# Patient Record
Sex: MALE | Race: WHITE | NOT HISPANIC OR LATINO | Employment: FULL TIME | URBAN - METROPOLITAN AREA
[De-identification: names, ages, dates, MRNs, and addresses within clinical notes are randomized per-mention and may not be internally consistent; named-entity substitution may affect disease eponyms.]

---

## 2023-03-22 DIAGNOSIS — E78.2 MIXED HYPERLIPIDEMIA: Primary | ICD-10-CM

## 2023-03-22 RX ORDER — ATORVASTATIN CALCIUM 20 MG/1
TABLET, FILM COATED ORAL
Qty: 90 TABLET | Refills: 0 | Status: SHIPPED | OUTPATIENT
Start: 2023-03-22 | End: 2023-06-20

## 2023-04-04 PROBLEM — E78.6 LOW HDL (UNDER 40): Status: ACTIVE | Noted: 2023-04-04

## 2023-04-04 PROBLEM — R05.8 ALLERGIC COUGH: Status: ACTIVE | Noted: 2023-04-04

## 2023-04-04 PROBLEM — E55.9 VITAMIN D DEFICIENCY: Status: ACTIVE | Noted: 2023-04-04

## 2023-04-04 PROBLEM — R53.83 FATIGUE: Status: ACTIVE | Noted: 2023-04-04

## 2023-04-04 PROBLEM — F41.9 ANXIETY AND DEPRESSION: Status: ACTIVE | Noted: 2023-04-04

## 2023-04-04 PROBLEM — K59.00 CONSTIPATION: Status: ACTIVE | Noted: 2023-04-04

## 2023-04-04 PROBLEM — F32.A ANXIETY AND DEPRESSION: Status: ACTIVE | Noted: 2023-04-04

## 2023-04-04 PROBLEM — K21.9 GERD (GASTROESOPHAGEAL REFLUX DISEASE): Status: ACTIVE | Noted: 2023-04-04

## 2023-04-04 PROBLEM — L30.9 ECZEMATOUS DERMATITIS: Status: ACTIVE | Noted: 2023-04-04

## 2023-04-04 PROBLEM — N40.0 BPH (BENIGN PROSTATIC HYPERPLASIA): Status: ACTIVE | Noted: 2023-04-04

## 2023-04-04 PROBLEM — I26.99 PULMONARY EMBOLISM (MULTI): Status: ACTIVE | Noted: 2023-04-04

## 2023-04-04 PROBLEM — G47.33 OBSTRUCTIVE SLEEP APNEA: Status: ACTIVE | Noted: 2023-04-04

## 2023-04-04 PROBLEM — H61.20 EXCESSIVE CERUMEN IN EAR CANAL: Status: ACTIVE | Noted: 2023-04-04

## 2023-04-04 PROBLEM — R13.10 DYSPHAGIA: Status: ACTIVE | Noted: 2023-04-04

## 2023-04-04 PROBLEM — E78.2 MIXED HYPERLIPIDEMIA: Status: ACTIVE | Noted: 2023-04-04

## 2023-04-04 PROBLEM — R32 URINARY INCONTINENCE: Status: ACTIVE | Noted: 2023-04-04

## 2023-04-04 PROBLEM — U07.1 COVID-19 VIRUS INFECTION: Status: ACTIVE | Noted: 2023-04-04

## 2023-04-04 RX ORDER — HYDROCORTISONE 1 %
CREAM (GRAM) TOPICAL 2 TIMES DAILY PRN
COMMUNITY
End: 2024-03-28 | Stop reason: SDUPTHER

## 2023-04-04 RX ORDER — MICONAZOLE NITRATE 2 %
POWDER (GRAM) TOPICAL
COMMUNITY
End: 2024-03-28 | Stop reason: SDUPTHER

## 2023-04-04 RX ORDER — CHOLECALCIFEROL (VITAMIN D3) 125 MCG
1 CAPSULE ORAL DAILY
COMMUNITY
Start: 2016-03-23 | End: 2023-12-06 | Stop reason: SDUPTHER

## 2023-04-04 RX ORDER — FAMOTIDINE 20 MG/1
1 TABLET, FILM COATED ORAL 2 TIMES DAILY
COMMUNITY
Start: 2021-04-10 | End: 2023-12-06 | Stop reason: SDUPTHER

## 2023-04-04 RX ORDER — METHYLPHENIDATE HYDROCHLORIDE EXTENDED RELEASE 10 MG/1
1 TABLET ORAL DAILY
COMMUNITY
Start: 2014-04-24 | End: 2024-02-04 | Stop reason: WASHOUT

## 2023-04-04 RX ORDER — FLUOCINONIDE 0.5 MG/G
CREAM TOPICAL
COMMUNITY
Start: 2014-03-12 | End: 2024-03-28 | Stop reason: SDUPTHER

## 2023-04-04 RX ORDER — ADHESIVE TAPE 1.5"X360"
TAPE, NON-MEDICATED TOPICAL
COMMUNITY
End: 2024-03-28 | Stop reason: SDUPTHER

## 2023-04-04 RX ORDER — APIXABAN 5 MG/1
1 TABLET, FILM COATED ORAL 2 TIMES DAILY
COMMUNITY
Start: 2021-04-12 | End: 2023-07-20

## 2023-04-04 RX ORDER — ACETAMINOPHEN 325 MG/1
2 TABLET ORAL EVERY 4 HOURS PRN
COMMUNITY
Start: 2019-10-18 | End: 2024-03-28 | Stop reason: SDUPTHER

## 2023-04-04 RX ORDER — ESCITALOPRAM OXALATE 10 MG/1
1 TABLET ORAL NIGHTLY
COMMUNITY
Start: 2014-04-24

## 2023-04-04 RX ORDER — ZINC SULFATE 50(220)MG
1 CAPSULE ORAL DAILY
COMMUNITY
Start: 2021-04-07 | End: 2023-05-19

## 2023-04-04 RX ORDER — TAMSULOSIN HYDROCHLORIDE 0.4 MG/1
1 CAPSULE ORAL NIGHTLY
COMMUNITY
Start: 2021-04-10 | End: 2023-10-19

## 2023-04-04 RX ORDER — BUPROPION HYDROCHLORIDE 75 MG/1
1 TABLET ORAL EVERY MORNING
COMMUNITY
Start: 2014-04-24

## 2023-04-04 RX ORDER — MULTIVITAMIN
1 TABLET ORAL DAILY
COMMUNITY
Start: 2017-06-06 | End: 2024-03-28 | Stop reason: SDUPTHER

## 2023-04-04 RX ORDER — MAGNESIUM HYDROXIDE 2400 MG/10ML
30 SUSPENSION ORAL DAILY PRN
COMMUNITY
End: 2024-03-28 | Stop reason: SDUPTHER

## 2023-04-04 RX ORDER — CALCIUM CARBONATE 200(500)MG
TABLET,CHEWABLE ORAL
COMMUNITY
End: 2024-03-28 | Stop reason: SDUPTHER

## 2023-04-04 RX ORDER — CLOTRIMAZOLE 1 %
CREAM (GRAM) TOPICAL
COMMUNITY
Start: 2021-09-15 | End: 2024-03-28 | Stop reason: SDUPTHER

## 2023-04-04 RX ORDER — DOCUSATE SODIUM 100 MG/1
1 CAPSULE, LIQUID FILLED ORAL 2 TIMES DAILY
COMMUNITY
Start: 2021-03-31 | End: 2023-12-15

## 2023-04-27 ENCOUNTER — TELEPHONE (OUTPATIENT)
Dept: PRIMARY CARE | Facility: CLINIC | Age: 43
End: 2023-04-27
Payer: MEDICARE

## 2023-04-27 RX ORDER — NEOMYCIN SULFATE, POLYMYXIN B SULFATE, AND DEXAMETHASONE 3.5; 10000; 1 MG/G; [USP'U]/G; MG/G
OINTMENT OPHTHALMIC
COMMUNITY
Start: 2023-04-26 | End: 2024-03-28 | Stop reason: WASHOUT

## 2023-04-27 NOTE — TELEPHONE ENCOUNTER
Hannah the RN from [facility] [place] is requesting a refill on Methylphenidate 10mg for pt as he's almost out.  Please send to [facility] care of rodrigo

## 2023-04-28 NOTE — TELEPHONE ENCOUNTER
[facility] called and left message for them that pt's Psychiatrist fills this medication for him,.

## 2023-05-05 ENCOUNTER — APPOINTMENT (OUTPATIENT)
Dept: PRIMARY CARE | Facility: CLINIC | Age: 43
End: 2023-05-05
Payer: MEDICARE

## 2023-05-16 ENCOUNTER — OFFICE VISIT (OUTPATIENT)
Dept: PRIMARY CARE | Facility: CLINIC | Age: 43
End: 2023-05-16
Payer: MEDICARE

## 2023-05-16 VITALS
TEMPERATURE: 97.9 F | WEIGHT: 173 LBS | BODY MASS INDEX: 32.66 KG/M2 | HEIGHT: 61 IN | SYSTOLIC BLOOD PRESSURE: 116 MMHG | DIASTOLIC BLOOD PRESSURE: 82 MMHG

## 2023-05-16 DIAGNOSIS — E78.2 MIXED HYPERLIPIDEMIA: Primary | ICD-10-CM

## 2023-05-16 DIAGNOSIS — E55.9 VITAMIN D DEFICIENCY: ICD-10-CM

## 2023-05-16 DIAGNOSIS — R73.09 ELEVATED GLUCOSE: ICD-10-CM

## 2023-05-16 DIAGNOSIS — R53.83 FATIGUE, UNSPECIFIED TYPE: ICD-10-CM

## 2023-05-16 PROBLEM — J80 ACUTE RESPIRATORY DISTRESS SYNDROME (MULTI): Status: ACTIVE | Noted: 2021-03-10

## 2023-05-16 PROBLEM — D64.9 CHRONIC ANEMIA: Status: ACTIVE | Noted: 2021-02-19

## 2023-05-16 PROBLEM — T78.3XXA ANGIOEDEMA OF TONGUE: Status: ACTIVE | Noted: 2021-02-19

## 2023-05-16 PROBLEM — F79 MENTAL HANDICAP: Status: ACTIVE | Noted: 2021-02-19

## 2023-05-16 PROBLEM — E87.6 HYPOKALEMIA: Status: ACTIVE | Noted: 2021-02-19

## 2023-05-16 PROBLEM — R13.12 DYSPHAGIA, OROPHARYNGEAL PHASE: Status: ACTIVE | Noted: 2021-02-19

## 2023-05-16 PROBLEM — J96.00 ACUTE RESPIRATORY FAILURE (MULTI): Status: ACTIVE | Noted: 2021-02-19

## 2023-05-16 PROBLEM — R06.9 RESPIRATORY ABNORMALITY: Status: ACTIVE | Noted: 2021-02-18

## 2023-05-16 PROBLEM — U07.1 COVID-19: Status: ACTIVE | Noted: 2021-02-21

## 2023-05-16 PROBLEM — I10 HYPERTENSION: Status: ACTIVE | Noted: 2021-02-19

## 2023-05-16 PROBLEM — J95.01: Status: ACTIVE | Noted: 2021-02-19

## 2023-05-16 PROCEDURE — 1036F TOBACCO NON-USER: CPT | Performed by: NURSE PRACTITIONER

## 2023-05-16 PROCEDURE — 99214 OFFICE O/P EST MOD 30 MIN: CPT | Performed by: NURSE PRACTITIONER

## 2023-05-16 PROCEDURE — 3074F SYST BP LT 130 MM HG: CPT | Performed by: NURSE PRACTITIONER

## 2023-05-16 PROCEDURE — 3079F DIAST BP 80-89 MM HG: CPT | Performed by: NURSE PRACTITIONER

## 2023-05-16 RX ORDER — BISACODYL 10 MG/1
10 SUPPOSITORY RECTAL DAILY PRN
COMMUNITY
Start: 2021-03-29 | End: 2024-03-28 | Stop reason: SDUPTHER

## 2023-05-16 RX ORDER — GUAIFENESIN 100 MG/5ML
400 SOLUTION ORAL 3 TIMES DAILY
COMMUNITY
Start: 2021-03-29 | End: 2024-02-01 | Stop reason: WASHOUT

## 2023-05-16 RX ORDER — MIDODRINE HYDROCHLORIDE 5 MG/1
5 TABLET ORAL 3 TIMES DAILY PRN
COMMUNITY
Start: 2021-03-29 | End: 2024-03-28 | Stop reason: WASHOUT

## 2023-05-16 ASSESSMENT — PATIENT HEALTH QUESTIONNAIRE - PHQ9
2. FEELING DOWN, DEPRESSED OR HOPELESS: NOT AT ALL
SUM OF ALL RESPONSES TO PHQ9 QUESTIONS 1 AND 2: 0
1. LITTLE INTEREST OR PLEASURE IN DOING THINGS: NOT AT ALL

## 2023-05-16 NOTE — PROGRESS NOTES
"Subjective   Patient ID: Dante Gillespie is a 42 y.o. male who presents for Annual Exam.    HPI   Here with Janell   Sleeping OK  Work M-F  OPHTH  PSYCH  Podiatry - comes to the house.  Regular walking for exercise.    [facility] - 90 day supply    Review of Systems   All other systems reviewed and are negative.        Objective   /82   Temp 36.6 °C (97.9 °F)   Ht 1.549 m (5' 1\")   Wt 78.5 kg (173 lb)   BMI 32.69 kg/m²     Physical Exam  Constitutional:       Appearance: He is obese.      Comments: Consistent with morphology   HENT:      Head: Atraumatic.      Right Ear: Tympanic membrane, ear canal and external ear normal.      Left Ear: Tympanic membrane and external ear normal.      Mouth/Throat:      Pharynx: Oropharynx is clear.   Eyes:      Conjunctiva/sclera: Conjunctivae normal.      Pupils: Pupils are equal, round, and reactive to light.   Cardiovascular:      Rate and Rhythm: Normal rate and regular rhythm.      Heart sounds: Normal heart sounds.   Pulmonary:      Effort: Pulmonary effort is normal.      Breath sounds: Normal breath sounds.   Abdominal:      Palpations: Abdomen is soft.   Genitourinary:     Comments: Defer  Musculoskeletal:      Cervical back: Neck supple.      Comments: Mildly unsteady gait  Poor flexibility     Neurological:      Mental Status: He is alert.       Assessment/Plan   Problem List Items Addressed This Visit          Endocrine/Metabolic    Vitamin D deficiency    Relevant Orders    Vitamin D, Total       Other    Fatigue    Relevant Orders    TSH with reflex to Free T4 if abnormal    CBC and Auto Differential    Mixed hyperlipidemia - Primary    Relevant Orders    Lipid Panel    Elevated glucose    Relevant Orders    Comprehensive Metabolic Panel    Hemoglobin A1C        Labs when fasting /hydrated and 3 month follow up   "

## 2023-05-16 NOTE — PATIENT INSTRUCTIONS
Good to see you today.  Labs when you are fasting 8 hours/hydrate/No supplements the day of the labs.  Lab hours are 6:30-4 M-F   8-12 Sat  No apt needed.  No changes in medications for now.  Add some stretching every day.  Let me know if you need anything.  3 month follow up

## 2023-05-19 DIAGNOSIS — Z00.00 HEALTHCARE MAINTENANCE: Primary | ICD-10-CM

## 2023-05-19 RX ORDER — ZINC SULFATE 50(220)MG
CAPSULE ORAL
Qty: 30 CAPSULE | Refills: 11 | Status: SHIPPED | OUTPATIENT
Start: 2023-05-19 | End: 2024-03-28 | Stop reason: SDUPTHER

## 2023-06-01 PROBLEM — R73.09 ELEVATED GLUCOSE: Status: ACTIVE | Noted: 2023-06-01

## 2023-06-20 DIAGNOSIS — E78.2 MIXED HYPERLIPIDEMIA: ICD-10-CM

## 2023-06-20 PROBLEM — J12.82 PNEUMONIA DUE TO COVID-19 VIRUS: Status: ACTIVE | Noted: 2021-02-02

## 2023-06-20 PROBLEM — U07.1 PNEUMONIA DUE TO COVID-19 VIRUS: Status: ACTIVE | Noted: 2021-02-02

## 2023-06-20 RX ORDER — ALBUTEROL SULFATE 90 UG/1
4 AEROSOL, METERED RESPIRATORY (INHALATION)
COMMUNITY
Start: 2021-02-18 | End: 2024-02-01 | Stop reason: WASHOUT

## 2023-06-20 RX ORDER — ATORVASTATIN CALCIUM 20 MG/1
TABLET, FILM COATED ORAL
Qty: 90 TABLET | Refills: 1 | Status: SHIPPED | OUTPATIENT
Start: 2023-06-20 | End: 2023-12-15

## 2023-07-20 DIAGNOSIS — Z86.711 HISTORY OF PULMONARY EMBOLISM: Primary | ICD-10-CM

## 2023-07-20 RX ORDER — APIXABAN 5 MG/1
5 TABLET, FILM COATED ORAL 2 TIMES DAILY
Qty: 60 TABLET | Refills: 10 | Status: SHIPPED | OUTPATIENT
Start: 2023-07-20 | End: 2024-02-01 | Stop reason: SDUPTHER

## 2023-10-19 DIAGNOSIS — N40.0 BENIGN PROSTATIC HYPERPLASIA, UNSPECIFIED WHETHER LOWER URINARY TRACT SYMPTOMS PRESENT: Primary | ICD-10-CM

## 2023-10-19 RX ORDER — TAMSULOSIN HYDROCHLORIDE 0.4 MG/1
0.4 CAPSULE ORAL
Qty: 90 CAPSULE | Refills: 0 | Status: SHIPPED | OUTPATIENT
Start: 2023-10-19 | End: 2024-01-23 | Stop reason: SDUPTHER

## 2023-10-19 NOTE — TELEPHONE ENCOUNTER
LOV: 05/16/23    Last refilled flomax 01/24/23 by you in the old system for 90 tabs with 2 refills

## 2023-11-07 ENCOUNTER — TELEPHONE (OUTPATIENT)
Dept: PRIMARY CARE | Facility: CLINIC | Age: 43
End: 2023-11-07
Payer: MEDICARE

## 2023-11-09 ENCOUNTER — TELEPHONE (OUTPATIENT)
Dept: PRIMARY CARE | Facility: CLINIC | Age: 43
End: 2023-11-09
Payer: MEDICARE

## 2023-11-09 NOTE — TELEPHONE ENCOUNTER
----- Message from Aisha Ross MA sent at 10/20/2023 10:23 AM EDT -----  Regarding: FW: RX    ----- Message -----  From: KAREN Dickson  Sent: 10/19/2023   4:52 PM EDT  To: Aisha Ross MA  Subject: RX                                               Please call group home.  Rinku missed his last apt.  I am getting refill requests.  Please be sure he is scheduled before the end of the year.

## 2023-11-28 DIAGNOSIS — E03.9 ADULT HYPOTHYROIDISM: Primary | ICD-10-CM

## 2023-11-28 RX ORDER — LEVOTHYROXINE SODIUM 75 UG/1
75 TABLET ORAL DAILY
Qty: 30 TABLET | Refills: 1 | Status: SHIPPED | OUTPATIENT
Start: 2023-11-28 | End: 2024-02-06 | Stop reason: WASHOUT

## 2023-12-01 DIAGNOSIS — E55.9 VITAMIN D DEFICIENCY: ICD-10-CM

## 2023-12-01 DIAGNOSIS — K21.9 GASTROESOPHAGEAL REFLUX DISEASE, UNSPECIFIED WHETHER ESOPHAGITIS PRESENT: Primary | ICD-10-CM

## 2023-12-06 NOTE — TELEPHONE ENCOUNTER
Shiela from [facility] is calling in again, looking for refills for the pt.     Famotidine 20mg   Vitamin D 5000 UT

## 2023-12-07 RX ORDER — FAMOTIDINE 20 MG/1
20 TABLET, FILM COATED ORAL 2 TIMES DAILY
Qty: 60 TABLET | Refills: 0 | Status: SHIPPED | OUTPATIENT
Start: 2023-12-07 | End: 2023-12-15

## 2023-12-07 RX ORDER — CHOLECALCIFEROL (VITAMIN D3) 125 MCG
125 CAPSULE ORAL DAILY
Qty: 30 CAPSULE | Refills: 0 | Status: SHIPPED | OUTPATIENT
Start: 2023-12-07 | End: 2023-12-15

## 2023-12-07 NOTE — TELEPHONE ENCOUNTER
Call to Chante at [facility]  Expressed concern about Bill missing appointment and not following up.  She indicated that he has a new provider.    She will follow up and call Mom if necessary.

## 2023-12-15 DIAGNOSIS — E78.2 MIXED HYPERLIPIDEMIA: ICD-10-CM

## 2023-12-15 DIAGNOSIS — K59.00 CONSTIPATION, UNSPECIFIED CONSTIPATION TYPE: Primary | ICD-10-CM

## 2023-12-15 DIAGNOSIS — K21.9 GASTROESOPHAGEAL REFLUX DISEASE, UNSPECIFIED WHETHER ESOPHAGITIS PRESENT: ICD-10-CM

## 2023-12-15 DIAGNOSIS — E55.9 VITAMIN D DEFICIENCY: ICD-10-CM

## 2023-12-15 RX ORDER — METHYLPHENIDATE HYDROCHLORIDE EXTENDED RELEASE 20 MG/1
20 TABLET ORAL
COMMUNITY
Start: 2023-11-28

## 2023-12-15 RX ORDER — ATORVASTATIN CALCIUM 20 MG/1
TABLET, FILM COATED ORAL
Qty: 30 TABLET | Refills: 0 | Status: SHIPPED | OUTPATIENT
Start: 2023-12-15 | End: 2024-01-23 | Stop reason: SDUPTHER

## 2023-12-15 RX ORDER — CHOLECALCIFEROL (VITAMIN D3) 125 MCG
125 CAPSULE ORAL DAILY
Qty: 30 CAPSULE | Refills: 0 | Status: SHIPPED | OUTPATIENT
Start: 2023-12-15 | End: 2024-01-23 | Stop reason: SDUPTHER

## 2023-12-15 RX ORDER — DOCUSATE SODIUM 100 MG/1
CAPSULE, LIQUID FILLED ORAL
Qty: 180 CAPSULE | Refills: 0 | Status: SHIPPED | OUTPATIENT
Start: 2023-12-15 | End: 2023-12-19 | Stop reason: SDUPTHER

## 2023-12-15 RX ORDER — FAMOTIDINE 20 MG/1
TABLET, FILM COATED ORAL
Qty: 60 TABLET | Refills: 0 | Status: SHIPPED | OUTPATIENT
Start: 2023-12-15 | End: 2024-01-23 | Stop reason: SDUPTHER

## 2023-12-19 DIAGNOSIS — K59.00 CONSTIPATION, UNSPECIFIED CONSTIPATION TYPE: ICD-10-CM

## 2023-12-19 RX ORDER — DOCUSATE SODIUM 100 MG/1
CAPSULE, LIQUID FILLED ORAL
Qty: 180 CAPSULE | Refills: 0 | Status: SHIPPED | OUTPATIENT
Start: 2023-12-19 | End: 2024-02-01 | Stop reason: SDUPTHER

## 2023-12-19 NOTE — TELEPHONE ENCOUNTER
Darryl the  for Dante calling for a refill on his Docusate Sodium 100mg, [facility] pharmacy.

## 2024-01-16 DIAGNOSIS — N40.0 BENIGN PROSTATIC HYPERPLASIA, UNSPECIFIED WHETHER LOWER URINARY TRACT SYMPTOMS PRESENT: ICD-10-CM

## 2024-01-16 DIAGNOSIS — K21.9 GASTROESOPHAGEAL REFLUX DISEASE, UNSPECIFIED WHETHER ESOPHAGITIS PRESENT: ICD-10-CM

## 2024-01-16 DIAGNOSIS — E55.9 VITAMIN D DEFICIENCY: ICD-10-CM

## 2024-01-16 DIAGNOSIS — E78.2 MIXED HYPERLIPIDEMIA: ICD-10-CM

## 2024-01-19 RX ORDER — TAMSULOSIN HYDROCHLORIDE 0.4 MG/1
0.4 CAPSULE ORAL
Qty: 90 CAPSULE | Refills: 0 | OUTPATIENT
Start: 2024-01-19

## 2024-01-19 RX ORDER — CHOLECALCIFEROL (VITAMIN D3) 125 MCG
125 CAPSULE ORAL DAILY
Qty: 30 CAPSULE | Refills: 0 | OUTPATIENT
Start: 2024-01-19

## 2024-01-19 RX ORDER — ATORVASTATIN CALCIUM 20 MG/1
TABLET, FILM COATED ORAL
Qty: 30 TABLET | Refills: 0 | OUTPATIENT
Start: 2024-01-19

## 2024-01-19 RX ORDER — FAMOTIDINE 20 MG/1
TABLET, FILM COATED ORAL
Qty: 60 TABLET | Refills: 0 | OUTPATIENT
Start: 2024-01-19

## 2024-01-23 DIAGNOSIS — E78.2 MIXED HYPERLIPIDEMIA: ICD-10-CM

## 2024-01-23 DIAGNOSIS — N40.0 BENIGN PROSTATIC HYPERPLASIA, UNSPECIFIED WHETHER LOWER URINARY TRACT SYMPTOMS PRESENT: ICD-10-CM

## 2024-01-23 DIAGNOSIS — K21.9 GASTROESOPHAGEAL REFLUX DISEASE, UNSPECIFIED WHETHER ESOPHAGITIS PRESENT: ICD-10-CM

## 2024-01-23 DIAGNOSIS — E55.9 VITAMIN D DEFICIENCY: ICD-10-CM

## 2024-01-23 RX ORDER — CHOLECALCIFEROL (VITAMIN D3) 125 MCG
125 CAPSULE ORAL DAILY
Qty: 30 CAPSULE | Refills: 0 | Status: SHIPPED | OUTPATIENT
Start: 2024-01-23 | End: 2024-02-01 | Stop reason: SDUPTHER

## 2024-01-23 RX ORDER — TAMSULOSIN HYDROCHLORIDE 0.4 MG/1
0.4 CAPSULE ORAL DAILY
Qty: 30 CAPSULE | Refills: 0 | Status: SHIPPED | OUTPATIENT
Start: 2024-01-23 | End: 2024-02-01 | Stop reason: SDUPTHER

## 2024-01-23 RX ORDER — FAMOTIDINE 20 MG/1
TABLET, FILM COATED ORAL
Qty: 60 TABLET | Refills: 0 | Status: SHIPPED | OUTPATIENT
Start: 2024-01-23 | End: 2024-02-01 | Stop reason: SDUPTHER

## 2024-01-23 RX ORDER — ATORVASTATIN CALCIUM 20 MG/1
20 TABLET, FILM COATED ORAL NIGHTLY
Qty: 30 TABLET | Refills: 0 | Status: SHIPPED | OUTPATIENT
Start: 2024-01-23 | End: 2024-02-01 | Stop reason: SDUPTHER

## 2024-01-23 NOTE — TELEPHONE ENCOUNTER
----- Message from KAREN Dickson sent at 1/17/2024  4:38 PM EST -----  Regarding: Follow up  I still have not seen him.  He has nothing pending.  He no showed last apt  I have reached out to Nursing for group home.  Please call and talk with manager and let know that he needs to be seen and should have labs before (I will refill rx to get him to that apt)

## 2024-01-23 NOTE — TELEPHONE ENCOUNTER
----- Message from KAREN Dickson sent at 11/19/2023  8:56 PM EST -----  Regarding: OV  Pt lives in group home.  He noshowed for his last OV    He is overduel  He also now needs [facility] and before visit should have labs- fasting 8 hours, hydrated and no supplements.  Is he out of meds?  He needs to be on them when labs are done.  Let me know what you find out.

## 2024-02-01 ENCOUNTER — OFFICE VISIT (OUTPATIENT)
Dept: PRIMARY CARE | Facility: CLINIC | Age: 44
End: 2024-02-01
Payer: MEDICARE

## 2024-02-01 VITALS
TEMPERATURE: 97.9 F | WEIGHT: 187 LBS | HEIGHT: 62 IN | SYSTOLIC BLOOD PRESSURE: 124 MMHG | DIASTOLIC BLOOD PRESSURE: 80 MMHG | BODY MASS INDEX: 34.41 KG/M2

## 2024-02-01 DIAGNOSIS — K59.00 CONSTIPATION, UNSPECIFIED CONSTIPATION TYPE: ICD-10-CM

## 2024-02-01 DIAGNOSIS — Z00.00 ROUTINE GENERAL MEDICAL EXAMINATION AT A HEALTH CARE FACILITY: Primary | ICD-10-CM

## 2024-02-01 DIAGNOSIS — E55.9 VITAMIN D DEFICIENCY: ICD-10-CM

## 2024-02-01 DIAGNOSIS — K21.9 GASTROESOPHAGEAL REFLUX DISEASE, UNSPECIFIED WHETHER ESOPHAGITIS PRESENT: ICD-10-CM

## 2024-02-01 DIAGNOSIS — N40.0 BENIGN PROSTATIC HYPERPLASIA, UNSPECIFIED WHETHER LOWER URINARY TRACT SYMPTOMS PRESENT: ICD-10-CM

## 2024-02-01 DIAGNOSIS — Z86.711 HISTORY OF PULMONARY EMBOLISM: ICD-10-CM

## 2024-02-01 DIAGNOSIS — Z12.11 COLON CANCER SCREENING: ICD-10-CM

## 2024-02-01 DIAGNOSIS — E78.2 MIXED HYPERLIPIDEMIA: ICD-10-CM

## 2024-02-01 PROBLEM — F33.0 DEPRESSION, MAJOR, RECURRENT, MILD (CMS-HCC): Status: ACTIVE | Noted: 2024-01-30

## 2024-02-01 PROCEDURE — 3079F DIAST BP 80-89 MM HG: CPT | Performed by: NURSE PRACTITIONER

## 2024-02-01 PROCEDURE — 3074F SYST BP LT 130 MM HG: CPT | Performed by: NURSE PRACTITIONER

## 2024-02-01 PROCEDURE — G0439 PPPS, SUBSEQ VISIT: HCPCS | Performed by: NURSE PRACTITIONER

## 2024-02-01 PROCEDURE — 1036F TOBACCO NON-USER: CPT | Performed by: NURSE PRACTITIONER

## 2024-02-01 RX ORDER — DOCUSATE SODIUM 100 MG/1
CAPSULE, LIQUID FILLED ORAL
Qty: 60 CAPSULE | Refills: 6 | Status: SHIPPED | OUTPATIENT
Start: 2024-02-01

## 2024-02-01 RX ORDER — TAMSULOSIN HYDROCHLORIDE 0.4 MG/1
0.4 CAPSULE ORAL DAILY
Qty: 30 CAPSULE | Refills: 6 | Status: SHIPPED | OUTPATIENT
Start: 2024-02-01

## 2024-02-01 RX ORDER — ATORVASTATIN CALCIUM 20 MG/1
20 TABLET, FILM COATED ORAL NIGHTLY
Qty: 30 TABLET | Refills: 6 | Status: SHIPPED | OUTPATIENT
Start: 2024-02-01

## 2024-02-01 RX ORDER — CHOLECALCIFEROL (VITAMIN D3) 125 MCG
125 CAPSULE ORAL DAILY
Qty: 30 CAPSULE | Refills: 6 | Status: SHIPPED | OUTPATIENT
Start: 2024-02-01

## 2024-02-01 RX ORDER — FAMOTIDINE 20 MG/1
TABLET, FILM COATED ORAL
Qty: 60 TABLET | Refills: 6 | Status: SHIPPED | OUTPATIENT
Start: 2024-02-01

## 2024-02-01 ASSESSMENT — ACTIVITIES OF DAILY LIVING (ADL)
DOING_HOUSEWORK: NEEDS ASSISTANCE
DRESSING: INDEPENDENT
MANAGING_FINANCES: TOTAL CARE
TAKING_MEDICATION: NEEDS ASSISTANCE
GROCERY_SHOPPING: TOTAL CARE
BATHING: NEEDS ASSISTANCE

## 2024-02-01 NOTE — PATIENT INSTRUCTIONS
Good to see you today.  Labs when fasting 8 hours/hydrated and no supplements  Plan to labs and then follow  up in 6 months.

## 2024-02-01 NOTE — PROGRESS NOTES
"Subjective   Patient ID: Dante Gillespie is a 43 y.o. male who presents for Medicare Annual Wellness Visit Subsequent.    HPI   Here with Darryl  today  [address]  Living in house with 3 others - more help available 24 x 7  Goes to work at [employer] - 5 days per week  Sees Dr. Barber annually or every 6 months for PSYCH medications.    No recent illnesses.  Did not do labs.  Has stopped thyroid Rx  unclear when or why      Review of Systems   Constitutional:  Positive for activity change.   All other systems reviewed and are negative.      Objective   /80   Temp 36.6 °C (97.9 °F)   Ht 1.575 m (5' 2\")   Wt 84.8 kg (187 lb)   BMI 34.20 kg/m²     Physical Exam  Vitals (consistent with syndrome) and nursing note reviewed.   Constitutional:       Appearance: He is obese.   HENT:      Head: Atraumatic.      Right Ear: External ear normal.      Left Ear: External ear normal.      Ears:      Comments: CARMEN  ear canals with minimal cerumen noted.       Nose: Nose normal.      Mouth/Throat:      Pharynx: Oropharynx is clear.   Eyes:      Pupils: Pupils are equal, round, and reactive to light.   Neck:      Thyroid: No thyroid mass, thyromegaly or thyroid tenderness.   Cardiovascular:      Rate and Rhythm: Normal rate and regular rhythm.      Pulses: Normal pulses.      Heart sounds: Normal heart sounds.   Pulmonary:      Effort: Pulmonary effort is normal.      Breath sounds: Normal breath sounds.   Abdominal:      General: Bowel sounds are normal.      Palpations: Abdomen is soft.   Genitourinary:     Comments: Defer  Musculoskeletal:      Cervical back: Neck supple.   Skin:     General: Skin is warm.   Neurological:      Mental Status: He is alert and oriented to person, place, and time.   Psychiatric:         Mood and Affect: Mood normal.         Behavior: Behavior normal.         Thought Content: Thought content normal.         Judgment: Judgment normal.      Comments: Gives agreeable " answers often         Assessment/Plan   Problem List Items Addressed This Visit             ICD-10-CM    BPH (benign prostatic hyperplasia) N40.0    Relevant Medications    tamsulosin (Flomax) 0.4 mg 24 hr capsule    Constipation K59.00    Relevant Medications    docusate sodium (Colace) 100 mg capsule    GERD (gastroesophageal reflux disease) K21.9    Relevant Medications    famotidine (Pepcid) 20 mg tablet    History of pulmonary embolism Z86.711    Relevant Medications    apixaban (Eliquis) 5 mg tablet    Mixed hyperlipidemia E78.2    Relevant Medications    atorvastatin (Lipitor) 20 mg tablet    Routine general medical examination at a health care facility - Primary Z00.00    Vitamin D deficiency E55.9    Relevant Medications    cholecalciferol (Vitamin D-3) 125 MCG (5000 UT) capsule

## 2024-02-04 PROBLEM — Z00.00 ROUTINE GENERAL MEDICAL EXAMINATION AT A HEALTH CARE FACILITY: Status: ACTIVE | Noted: 2024-02-04

## 2024-02-04 PROBLEM — Z12.11 COLON CANCER SCREENING: Status: ACTIVE | Noted: 2024-02-04

## 2024-02-04 PROBLEM — Z86.711 HISTORY OF PULMONARY EMBOLISM: Status: ACTIVE | Noted: 2024-02-04

## 2024-02-04 ASSESSMENT — ACTIVITIES OF DAILY LIVING (ADL)
GROCERY_SHOPPING: TOTAL CARE
TAKING_MEDICATION: NEEDS ASSISTANCE
BATHING: NEEDS ASSISTANCE
DOING_HOUSEWORK: NEEDS ASSISTANCE
DRESSING: INDEPENDENT
MANAGING_FINANCES: TOTAL CARE

## 2024-02-04 ASSESSMENT — PATIENT HEALTH QUESTIONNAIRE - PHQ9
1. LITTLE INTEREST OR PLEASURE IN DOING THINGS: NOT AT ALL
SUM OF ALL RESPONSES TO PHQ9 QUESTIONS 1 AND 2: 0
2. FEELING DOWN, DEPRESSED OR HOPELESS: NOT AT ALL

## 2024-02-04 ASSESSMENT — ENCOUNTER SYMPTOMS: ACTIVITY CHANGE: 1

## 2024-02-06 ENCOUNTER — TELEPHONE (OUTPATIENT)
Dept: PRIMARY CARE | Facility: CLINIC | Age: 44
End: 2024-02-06
Payer: MEDICARE

## 2024-02-06 NOTE — TELEPHONE ENCOUNTER
----- Message from CURLY Dickson-CNP sent at 2/4/2024  9:30 AM EST -----  Regarding: RX  Please call [facility] and see when they last filled Levothyroxine for him, and verify the dose please.  THANKS

## 2024-02-06 NOTE — TELEPHONE ENCOUNTER
Spoke to Sabina they have no record of pt receiving any Thyroid meds from them and he has been with that pharmacy since 2017.

## 2024-02-08 ENCOUNTER — LAB (OUTPATIENT)
Dept: LAB | Facility: LAB | Age: 44
End: 2024-02-08
Payer: MEDICARE

## 2024-02-08 DIAGNOSIS — E55.9 VITAMIN D DEFICIENCY: ICD-10-CM

## 2024-02-08 DIAGNOSIS — E78.2 MIXED HYPERLIPIDEMIA: ICD-10-CM

## 2024-02-08 DIAGNOSIS — R73.09 ELEVATED GLUCOSE: ICD-10-CM

## 2024-02-08 DIAGNOSIS — R53.83 FATIGUE, UNSPECIFIED TYPE: ICD-10-CM

## 2024-02-08 LAB
25(OH)D3 SERPL-MCNC: 46 NG/ML (ref 30–100)
ALBUMIN SERPL BCP-MCNC: 3.6 G/DL (ref 3.4–5)
ALP SERPL-CCNC: 64 U/L (ref 33–120)
ALT SERPL W P-5'-P-CCNC: 18 U/L (ref 10–52)
ANION GAP SERPL CALC-SCNC: 9 MMOL/L (ref 10–20)
AST SERPL W P-5'-P-CCNC: 18 U/L (ref 9–39)
BASOPHILS # BLD AUTO: 0.08 X10*3/UL (ref 0–0.1)
BASOPHILS NFR BLD AUTO: 1.9 %
BILIRUB SERPL-MCNC: 0.4 MG/DL (ref 0–1.2)
BUN SERPL-MCNC: 17 MG/DL (ref 6–23)
CALCIUM SERPL-MCNC: 8.3 MG/DL (ref 8.6–10.3)
CHLORIDE SERPL-SCNC: 105 MMOL/L (ref 98–107)
CHOLEST SERPL-MCNC: 121 MG/DL (ref 0–199)
CHOLESTEROL/HDL RATIO: 3.5
CO2 SERPL-SCNC: 31 MMOL/L (ref 21–32)
CREAT SERPL-MCNC: 0.9 MG/DL (ref 0.5–1.3)
EGFRCR SERPLBLD CKD-EPI 2021: >90 ML/MIN/1.73M*2
EOSINOPHIL # BLD AUTO: 0.06 X10*3/UL (ref 0–0.7)
EOSINOPHIL NFR BLD AUTO: 1.4 %
ERYTHROCYTE [DISTWIDTH] IN BLOOD BY AUTOMATED COUNT: 14.1 % (ref 11.5–14.5)
EST. AVERAGE GLUCOSE BLD GHB EST-MCNC: 120 MG/DL
GLUCOSE SERPL-MCNC: 79 MG/DL (ref 74–99)
HBA1C MFR BLD: 5.8 %
HCT VFR BLD AUTO: 47.5 % (ref 41–52)
HDLC SERPL-MCNC: 34.2 MG/DL
HGB BLD-MCNC: 15.5 G/DL (ref 13.5–17.5)
IMM GRANULOCYTES # BLD AUTO: 0.02 X10*3/UL (ref 0–0.7)
IMM GRANULOCYTES NFR BLD AUTO: 0.5 % (ref 0–0.9)
LDLC SERPL CALC-MCNC: 63 MG/DL
LYMPHOCYTES # BLD AUTO: 1.39 X10*3/UL (ref 1.2–4.8)
LYMPHOCYTES NFR BLD AUTO: 33.3 %
MCH RBC QN AUTO: 30 PG (ref 26–34)
MCHC RBC AUTO-ENTMCNC: 32.6 G/DL (ref 32–36)
MCV RBC AUTO: 92 FL (ref 80–100)
MONOCYTES # BLD AUTO: 0.35 X10*3/UL (ref 0.1–1)
MONOCYTES NFR BLD AUTO: 8.4 %
NEUTROPHILS # BLD AUTO: 2.28 X10*3/UL (ref 1.2–7.7)
NEUTROPHILS NFR BLD AUTO: 54.5 %
NON HDL CHOLESTEROL: 87 MG/DL (ref 0–149)
NRBC BLD-RTO: 0 /100 WBCS (ref 0–0)
PLATELET # BLD AUTO: 297 X10*3/UL (ref 150–450)
POTASSIUM SERPL-SCNC: 4.2 MMOL/L (ref 3.5–5.3)
PROT SERPL-MCNC: 6.8 G/DL (ref 6.4–8.2)
RBC # BLD AUTO: 5.17 X10*6/UL (ref 4.5–5.9)
SODIUM SERPL-SCNC: 141 MMOL/L (ref 136–145)
TRIGL SERPL-MCNC: 120 MG/DL (ref 0–149)
TSH SERPL-ACNC: 2.01 MIU/L (ref 0.44–3.98)
VLDL: 24 MG/DL (ref 0–40)
WBC # BLD AUTO: 4.2 X10*3/UL (ref 4.4–11.3)

## 2024-02-08 PROCEDURE — 82306 VITAMIN D 25 HYDROXY: CPT

## 2024-02-08 PROCEDURE — 36415 COLL VENOUS BLD VENIPUNCTURE: CPT

## 2024-02-08 PROCEDURE — 83036 HEMOGLOBIN GLYCOSYLATED A1C: CPT

## 2024-02-08 PROCEDURE — 80061 LIPID PANEL: CPT

## 2024-02-08 PROCEDURE — 85025 COMPLETE CBC W/AUTO DIFF WBC: CPT

## 2024-02-08 PROCEDURE — 84443 ASSAY THYROID STIM HORMONE: CPT

## 2024-02-08 PROCEDURE — 80053 COMPREHEN METABOLIC PANEL: CPT

## 2024-03-28 ENCOUNTER — TELEPHONE (OUTPATIENT)
Dept: PRIMARY CARE | Facility: CLINIC | Age: 44
End: 2024-03-28
Payer: MEDICARE

## 2024-03-28 DIAGNOSIS — B35.6 JOCK ITCH: ICD-10-CM

## 2024-03-28 DIAGNOSIS — L55.9 SUNBURN: ICD-10-CM

## 2024-03-28 DIAGNOSIS — H61.20 IMPACTED CERUMEN, UNSPECIFIED LATERALITY: ICD-10-CM

## 2024-03-28 DIAGNOSIS — B35.3 TINEA PEDIS OF BOTH FEET: Primary | ICD-10-CM

## 2024-03-28 DIAGNOSIS — R50.9 FEVER, UNSPECIFIED FEVER CAUSE: Primary | ICD-10-CM

## 2024-03-28 DIAGNOSIS — L30.9 DERMATITIS: ICD-10-CM

## 2024-03-28 DIAGNOSIS — K30 UPSET STOMACH: ICD-10-CM

## 2024-03-28 DIAGNOSIS — E78.2 MIXED HYPERLIPIDEMIA: ICD-10-CM

## 2024-03-28 DIAGNOSIS — R23.8 SKIN IRRITATION: ICD-10-CM

## 2024-03-28 DIAGNOSIS — D72.819 LEUKOPENIA, UNSPECIFIED TYPE: ICD-10-CM

## 2024-03-28 DIAGNOSIS — R73.09 ELEVATED GLUCOSE: Primary | ICD-10-CM

## 2024-03-28 DIAGNOSIS — K59.00 CONSTIPATION, UNSPECIFIED CONSTIPATION TYPE: ICD-10-CM

## 2024-03-28 DIAGNOSIS — Z00.00 HEALTHCARE MAINTENANCE: ICD-10-CM

## 2024-03-28 RX ORDER — ZINC SULFATE 50(220)MG
CAPSULE ORAL
Qty: 30 CAPSULE | Refills: 11 | Status: SHIPPED | OUTPATIENT
Start: 2024-03-28

## 2024-03-28 RX ORDER — FLUOCINONIDE 0.5 MG/G
CREAM TOPICAL
Qty: 60 G | Refills: 1 | Status: SHIPPED | OUTPATIENT
Start: 2024-03-28

## 2024-03-28 RX ORDER — MICONAZOLE NITRATE 2 %
POWDER (GRAM) TOPICAL
Qty: 85 G | Refills: 1 | Status: SHIPPED | OUTPATIENT
Start: 2024-03-28

## 2024-03-28 RX ORDER — CLOTRIMAZOLE 1 %
CREAM (GRAM) TOPICAL 2 TIMES DAILY
Qty: 60 G | Refills: 1 | Status: SHIPPED | OUTPATIENT
Start: 2024-03-28 | End: 2024-03-28 | Stop reason: SDUPTHER

## 2024-03-28 RX ORDER — ADHESIVE TAPE 1.5"X360"
TAPE, NON-MEDICATED TOPICAL
Qty: 170 G | Refills: 3 | Status: SHIPPED | OUTPATIENT
Start: 2024-03-28

## 2024-03-28 RX ORDER — BISACODYL 10 MG/1
10 SUPPOSITORY RECTAL DAILY PRN
Qty: 12 SUPPOSITORY | Refills: 1 | Status: SHIPPED | OUTPATIENT
Start: 2024-03-28

## 2024-03-28 RX ORDER — ACETAMINOPHEN 325 MG/1
650 TABLET ORAL EVERY 4 HOURS PRN
Qty: 30 TABLET | Refills: 1 | Status: SHIPPED | OUTPATIENT
Start: 2024-03-28

## 2024-03-28 RX ORDER — HYDROCORTISONE 1 %
CREAM (GRAM) TOPICAL 2 TIMES DAILY PRN
Qty: 30 G | Refills: 1 | Status: SHIPPED | OUTPATIENT
Start: 2024-03-28

## 2024-03-28 RX ORDER — CLOTRIMAZOLE 1 %
CREAM (GRAM) TOPICAL
Qty: 60 G | Refills: 1 | Status: SHIPPED | OUTPATIENT
Start: 2024-03-28

## 2024-03-28 RX ORDER — MULTIVITAMIN
1 TABLET ORAL DAILY
Qty: 100 TABLET | Refills: 3 | Status: SHIPPED | OUTPATIENT
Start: 2024-03-28

## 2024-03-28 RX ORDER — CALCIUM CARBONATE 200(500)MG
TABLET,CHEWABLE ORAL
Qty: 60 TABLET | Refills: 1 | Status: SHIPPED | OUTPATIENT
Start: 2024-03-28

## 2024-03-28 RX ORDER — MAGNESIUM HYDROXIDE 2400 MG/10ML
30 SUSPENSION ORAL DAILY PRN
Qty: 200 ML | Refills: 1 | Status: SHIPPED | OUTPATIENT
Start: 2024-03-28

## 2024-03-28 NOTE — TELEPHONE ENCOUNTER
Tri from [facility] has questions on 2 of the medications sent in: the Clotrimazole cream previous directions were to apply to feet BID for 3 weeks then BID PRN. Now it is written as a scheduled Rx to be applied topically BID and the other is: the Debrox ear drops, previous directions were were 5 drops both ears daily on the first day of the month. Now it is written as a scheduled Rx for 5 drops each ear BID & he lives in a group home, it would have to be BID every day. She just wanted to confirm/verify these changes, please advise

## 2024-08-01 ENCOUNTER — APPOINTMENT (OUTPATIENT)
Dept: PRIMARY CARE | Facility: CLINIC | Age: 44
End: 2024-08-01
Payer: MEDICARE

## 2024-09-11 DIAGNOSIS — N40.0 BENIGN PROSTATIC HYPERPLASIA, UNSPECIFIED WHETHER LOWER URINARY TRACT SYMPTOMS PRESENT: ICD-10-CM

## 2024-09-11 DIAGNOSIS — K21.9 GASTROESOPHAGEAL REFLUX DISEASE, UNSPECIFIED WHETHER ESOPHAGITIS PRESENT: ICD-10-CM

## 2024-09-11 DIAGNOSIS — E55.9 VITAMIN D DEFICIENCY: ICD-10-CM

## 2024-09-11 DIAGNOSIS — E78.2 MIXED HYPERLIPIDEMIA: ICD-10-CM

## 2024-09-20 NOTE — TELEPHONE ENCOUNTER
Shiela from [facility] is requesting refills for atorvastatin, vitamin d3, famotidine, and tamsulosin

## 2024-09-23 RX ORDER — CHOLECALCIFEROL (VITAMIN D3) 125 MCG
125 CAPSULE ORAL DAILY
Qty: 30 CAPSULE | Refills: 5 | Status: SHIPPED | OUTPATIENT
Start: 2024-09-23

## 2024-09-23 RX ORDER — FAMOTIDINE 20 MG/1
TABLET, FILM COATED ORAL
Qty: 60 TABLET | Refills: 5 | Status: SHIPPED | OUTPATIENT
Start: 2024-09-23

## 2024-09-23 RX ORDER — ATORVASTATIN CALCIUM 20 MG/1
20 TABLET, FILM COATED ORAL NIGHTLY
Qty: 30 TABLET | Refills: 5 | Status: SHIPPED | OUTPATIENT
Start: 2024-09-23

## 2024-09-23 RX ORDER — TAMSULOSIN HYDROCHLORIDE 0.4 MG/1
CAPSULE ORAL
Qty: 30 CAPSULE | Refills: 5 | Status: SHIPPED | OUTPATIENT
Start: 2024-09-23

## 2024-12-10 DIAGNOSIS — K59.00 CONSTIPATION, UNSPECIFIED CONSTIPATION TYPE: ICD-10-CM

## 2024-12-10 RX ORDER — DOCUSATE SODIUM 100 MG/1
CAPSULE, LIQUID FILLED ORAL
Qty: 60 CAPSULE | Refills: 5 | Status: SHIPPED | OUTPATIENT
Start: 2024-12-10

## 2024-12-10 RX ORDER — METHYLPHENIDATE HYDROCHLORIDE EXTENDED RELEASE 10 MG/1
10 TABLET ORAL DAILY
COMMUNITY
Start: 2024-11-20

## 2025-01-02 DIAGNOSIS — K59.00 CONSTIPATION, UNSPECIFIED CONSTIPATION TYPE: ICD-10-CM

## 2025-01-03 ENCOUNTER — TELEPHONE (OUTPATIENT)
Dept: PRIMARY CARE | Facility: CLINIC | Age: 45
End: 2025-01-03
Payer: MEDICARE

## 2025-01-03 DIAGNOSIS — K59.00 CONSTIPATION, UNSPECIFIED CONSTIPATION TYPE: ICD-10-CM

## 2025-01-03 RX ORDER — ASCORBIC ACID 1000 MG
TABLET ORAL
Qty: 200 ML | Refills: 0 | Status: SHIPPED | OUTPATIENT
Start: 2025-01-03 | End: 2025-01-04 | Stop reason: SDUPTHER

## 2025-01-03 NOTE — TELEPHONE ENCOUNTER
Amanda from [facility] ([facility] in [place]) called asking that the prescription of Milk of Magnesia Suspension could bwe resent as  473 ML so it can be a full bottle.  We sent 200 ml with 0 refills.    Amanda can be reached at [phone]

## 2025-01-04 DIAGNOSIS — K59.00 CONSTIPATION, UNSPECIFIED CONSTIPATION TYPE: ICD-10-CM

## 2025-01-04 RX ORDER — ADHESIVE BANDAGE
BANDAGE TOPICAL
Qty: 200 ML | Refills: 0 | OUTPATIENT
Start: 2025-01-04

## 2025-01-04 RX ORDER — ADHESIVE BANDAGE
30 BANDAGE TOPICAL NIGHTLY PRN
Qty: 473 ML | Refills: 1 | Status: SHIPPED | OUTPATIENT
Start: 2025-01-04

## 2025-01-06 ENCOUNTER — TELEPHONE (OUTPATIENT)
Dept: PRIMARY CARE | Facility: CLINIC | Age: 45
End: 2025-01-06
Payer: MEDICARE

## 2025-01-06 DIAGNOSIS — L03.90 CELLULITIS, UNSPECIFIED CELLULITIS SITE: Primary | ICD-10-CM

## 2025-01-06 RX ORDER — MUPIROCIN 20 MG/G
OINTMENT TOPICAL 3 TIMES DAILY
Qty: 22 G | Refills: 0 | Status: SHIPPED | OUTPATIENT
Start: 2025-01-06 | End: 2025-02-06

## 2025-01-06 NOTE — TELEPHONE ENCOUNTER
Rx sent       [facility] is calling in asking for a refill on bacitracin. SIG: TID a daily up to 3 days PRN.

## 2025-01-09 DIAGNOSIS — Z86.711 HISTORY OF PULMONARY EMBOLISM: ICD-10-CM

## 2025-01-11 RX ORDER — APIXABAN 5 MG/1
TABLET, FILM COATED ORAL
Qty: 60 TABLET | Refills: 5 | Status: SHIPPED | OUTPATIENT
Start: 2025-01-11

## 2025-02-04 ENCOUNTER — APPOINTMENT (OUTPATIENT)
Dept: PRIMARY CARE | Facility: CLINIC | Age: 45
End: 2025-02-04
Payer: MEDICARE

## 2025-02-04 VITALS
SYSTOLIC BLOOD PRESSURE: 102 MMHG | WEIGHT: 188.8 LBS | OXYGEN SATURATION: 96 % | HEART RATE: 91 BPM | BODY MASS INDEX: 35.65 KG/M2 | DIASTOLIC BLOOD PRESSURE: 78 MMHG | TEMPERATURE: 97.7 F | HEIGHT: 61 IN

## 2025-02-04 DIAGNOSIS — Z12.5 SCREENING FOR PROSTATE CANCER: ICD-10-CM

## 2025-02-04 DIAGNOSIS — Z00.00 ROUTINE GENERAL MEDICAL EXAMINATION AT A HEALTH CARE FACILITY: Primary | ICD-10-CM

## 2025-02-04 DIAGNOSIS — I10 PRIMARY HYPERTENSION: ICD-10-CM

## 2025-02-04 DIAGNOSIS — R53.83 OTHER FATIGUE: ICD-10-CM

## 2025-02-04 DIAGNOSIS — E66.01 OBESITY, MORBID (MULTI): ICD-10-CM

## 2025-02-04 DIAGNOSIS — G47.33 OBSTRUCTIVE SLEEP APNEA: ICD-10-CM

## 2025-02-04 DIAGNOSIS — E78.2 MIXED HYPERLIPIDEMIA: ICD-10-CM

## 2025-02-04 DIAGNOSIS — K59.09 OTHER CONSTIPATION: ICD-10-CM

## 2025-02-04 DIAGNOSIS — Q90.9 COMPLETE TRISOMY 21 SYNDROME (HHS-HCC): ICD-10-CM

## 2025-02-04 DIAGNOSIS — R39.198 DIFFICULTY URINATING: ICD-10-CM

## 2025-02-04 DIAGNOSIS — F79 INTELLECTUAL DISABILITY: ICD-10-CM

## 2025-02-04 DIAGNOSIS — E55.9 VITAMIN D DEFICIENCY: ICD-10-CM

## 2025-02-04 DIAGNOSIS — R73.09 ELEVATED GLUCOSE: ICD-10-CM

## 2025-02-04 PROBLEM — J80 ACUTE RESPIRATORY DISTRESS SYNDROME (MULTI): Status: RESOLVED | Noted: 2021-03-10 | Resolved: 2025-02-04

## 2025-02-04 PROBLEM — J95.01: Status: RESOLVED | Noted: 2021-02-19 | Resolved: 2025-02-04

## 2025-02-04 PROBLEM — J96.00 ACUTE RESPIRATORY FAILURE: Status: RESOLVED | Noted: 2021-02-19 | Resolved: 2025-02-04

## 2025-02-04 RX ORDER — CALCIUM CARBONATE 200(500)MG
2 TABLET,CHEWABLE ORAL DAILY PRN
COMMUNITY

## 2025-02-04 RX ORDER — NEOMYCIN SULFATE, POLYMYXIN B SULFATE, AND DEXAMETHASONE 3.5; 10000; 1 MG/G; [USP'U]/G; MG/G
OINTMENT OPHTHALMIC 2 TIMES DAILY
COMMUNITY

## 2025-02-04 ASSESSMENT — PATIENT HEALTH QUESTIONNAIRE - PHQ9
1. LITTLE INTEREST OR PLEASURE IN DOING THINGS: NOT AT ALL
SUM OF ALL RESPONSES TO PHQ9 QUESTIONS 1 AND 2: 0
2. FEELING DOWN, DEPRESSED OR HOPELESS: NOT AT ALL
1. LITTLE INTEREST OR PLEASURE IN DOING THINGS: NOT AT ALL
SUM OF ALL RESPONSES TO PHQ9 QUESTIONS 1 AND 2: 0
2. FEELING DOWN, DEPRESSED OR HOPELESS: NOT AT ALL

## 2025-02-04 ASSESSMENT — ACTIVITIES OF DAILY LIVING (ADL)
DOING_HOUSEWORK: TOTAL CARE
TAKING_MEDICATION: TOTAL CARE
GROCERY_SHOPPING: TOTAL CARE
MANAGING_FINANCES: TOTAL CARE
DRESSING: INDEPENDENT
BATHING: INDEPENDENT

## 2025-02-04 NOTE — PROGRESS NOTES
"Subjective   Patient ID: Dante Gillespie is a 44 y.o. male who presents for Medicare Annual Wellness Visit Subsequent.    [facility]   Madelyn here with patient today.  Still with [facility]    Does Day Program - [facility]  [employer] participant  Dr. Barber, PSYCH every 3 months  Podiatry comes to house/group home  [facility]    [facility] pharmacy  DENTIST- [facility]  OPHTH  -Rajan  Has not been consistent with follow up     Review of Systems   Constitutional:  Positive for activity change and appetite change.   HENT:  Negative for congestion.    Respiratory:  Positive for apnea.    Cardiovascular:  Negative for chest pain, palpitations and leg swelling.   Gastrointestinal:  Negative for abdominal distention and abdominal pain.   Endocrine: Negative for polydipsia, polyphagia and polyuria.   Psychiatric/Behavioral:  Positive for decreased concentration and dysphoric mood.          Objective   /78 (BP Location: Left arm, Patient Position: Sitting)   Pulse 91   Temp 36.5 °C (97.7 °F) (Temporal)   Ht 1.558 m (5' 1.34\")   Wt 85.6 kg (188 lb 12.8 oz)   SpO2 96%   BMI 35.28 kg/m²     Physical Exam  Vitals and nursing note reviewed.   Constitutional:       Comments: Consistent with syndrome.     HENT:      Head: Normocephalic and atraumatic.      Right Ear: Tympanic membrane, ear canal and external ear normal.      Left Ear: Tympanic membrane, ear canal and external ear normal.      Nose: Nose normal.      Mouth/Throat:      Pharynx: Oropharynx is clear.   Eyes:      Pupils: Pupils are equal, round, and reactive to light.   Cardiovascular:      Rate and Rhythm: Normal rate and regular rhythm.      Pulses: Normal pulses.      Heart sounds: Normal heart sounds.   Pulmonary:      Effort: Pulmonary effort is normal.      Breath sounds: Normal breath sounds.   Abdominal:      General: Bowel sounds are normal.      Palpations: Abdomen is soft.   Musculoskeletal:      Comments: " Low muscle tone noted.    Poor posture.  Sits with head tipped forward.   Skin:     General: Skin is warm.   Neurological:      Mental Status: He is oriented to person, place, and time.   Psychiatric:         Behavior: Behavior normal.      Comments: Pleasant and cooperative.         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[facility])         Primary hypertension    Orders:    CBC; Future    Elevated glucose    Orders:    Comprehensive Metabolic Panel; Future    Hemoglobin A1C; Future    Other constipation         Intellectual disability         Obstructive sleep apnea         Other fatigue    Orders:    TSH with reflex to Free T4 if abnormal; Future    Difficulty urinating    Orders:    Prostate Specific Antigen, Screen; Future    Mixed hyperlipidemia    Orders:    Lipid Panel; Future    Vitamin D deficiency    Orders:    Vitamin D 25-Hydroxy,Total (for eval of Vitamin D levels); Future    Screening for prostate cancer    Orders:    Prostate Specific Antigen, Screen; Future

## 2025-02-04 NOTE — PATIENT INSTRUCTIONS
Good to see you today  Plan on 3 month follow up with fasting labs that day.  Labs:  Fast 8 hours/hydrate well/NO SUPPLEMENTS the day of the test, and take regular medications.    Work on weight loss ~ 5-8 pounds  Drink 50 ounces of water every day   Exercise ~ 30-40 minutes most days.  Continue with current medications.

## 2025-03-07 DIAGNOSIS — N40.0 BENIGN PROSTATIC HYPERPLASIA, UNSPECIFIED WHETHER LOWER URINARY TRACT SYMPTOMS PRESENT: ICD-10-CM

## 2025-03-07 DIAGNOSIS — E55.9 VITAMIN D DEFICIENCY: ICD-10-CM

## 2025-03-07 DIAGNOSIS — K21.9 GASTROESOPHAGEAL REFLUX DISEASE, UNSPECIFIED WHETHER ESOPHAGITIS PRESENT: ICD-10-CM

## 2025-03-07 DIAGNOSIS — Z00.00 HEALTHCARE MAINTENANCE: ICD-10-CM

## 2025-03-07 DIAGNOSIS — E78.2 MIXED HYPERLIPIDEMIA: ICD-10-CM

## 2025-03-09 RX ORDER — ATORVASTATIN CALCIUM 20 MG/1
20 TABLET, FILM COATED ORAL NIGHTLY
Qty: 30 TABLET | Refills: 11 | Status: SHIPPED | OUTPATIENT
Start: 2025-03-09

## 2025-03-09 RX ORDER — MULTIVITAMIN WITH FOLIC ACID 400 MCG
TABLET ORAL
Qty: 100 TABLET | Refills: 2 | Status: SHIPPED | OUTPATIENT
Start: 2025-03-09

## 2025-03-09 RX ORDER — TAMSULOSIN HYDROCHLORIDE 0.4 MG/1
CAPSULE ORAL
Qty: 30 CAPSULE | Refills: 4 | Status: SHIPPED | OUTPATIENT
Start: 2025-03-09

## 2025-03-09 RX ORDER — FAMOTIDINE 20 MG/1
TABLET, FILM COATED ORAL
Qty: 60 TABLET | Refills: 4 | Status: SHIPPED | OUTPATIENT
Start: 2025-03-09

## 2025-03-09 RX ORDER — VIT C/E/ZN/COPPR/LUTEIN/ZEAXAN 250MG-90MG
125 CAPSULE ORAL DAILY
Qty: 30 CAPSULE | Refills: 4 | Status: SHIPPED | OUTPATIENT
Start: 2025-03-09

## 2025-03-22 PROBLEM — E87.6 HYPOKALEMIA: Status: RESOLVED | Noted: 2021-02-19 | Resolved: 2025-03-22

## 2025-03-22 PROBLEM — Z86.711 HISTORY OF PULMONARY EMBOLISM: Status: ACTIVE | Noted: 2023-04-04

## 2025-03-22 PROBLEM — U07.1 COVID-19: Status: RESOLVED | Noted: 2021-02-21 | Resolved: 2025-03-22

## 2025-03-22 PROBLEM — R06.9 RESPIRATORY ABNORMALITY: Status: RESOLVED | Noted: 2021-02-18 | Resolved: 2025-03-22

## 2025-03-22 PROBLEM — T78.3XXA ANGIOEDEMA OF TONGUE: Status: RESOLVED | Noted: 2021-02-19 | Resolved: 2025-03-22

## 2025-03-22 PROBLEM — U07.1 PNEUMONIA DUE TO COVID-19 VIRUS: Status: RESOLVED | Noted: 2021-02-02 | Resolved: 2025-03-22

## 2025-03-22 PROBLEM — J12.82 PNEUMONIA DUE TO COVID-19 VIRUS: Status: RESOLVED | Noted: 2021-02-02 | Resolved: 2025-03-22

## 2025-03-22 PROBLEM — U07.1 COVID-19 VIRUS INFECTION: Status: RESOLVED | Noted: 2023-04-04 | Resolved: 2025-03-22

## 2025-03-22 ASSESSMENT — ENCOUNTER SYMPTOMS
APNEA: 1
POLYPHAGIA: 0
POLYDIPSIA: 0
ACTIVITY CHANGE: 1
DECREASED CONCENTRATION: 1
PALPITATIONS: 0
ABDOMINAL DISTENTION: 0
DYSPHORIC MOOD: 1
APPETITE CHANGE: 1
ABDOMINAL PAIN: 0

## 2025-03-22 ASSESSMENT — ACTIVITIES OF DAILY LIVING (ADL)
GROCERY_SHOPPING: TOTAL CARE
BATHING: INDEPENDENT
MANAGING_FINANCES: TOTAL CARE
TAKING_MEDICATION: NEEDS ASSISTANCE
DOING_HOUSEWORK: NEEDS ASSISTANCE
DRESSING: INDEPENDENT

## 2025-04-15 ENCOUNTER — TELEPHONE (OUTPATIENT)
Dept: PRIMARY CARE | Facility: CLINIC | Age: 45
End: 2025-04-15
Payer: MEDICARE

## 2025-04-15 NOTE — TELEPHONE ENCOUNTER
Estefani carcamo's mom calling to see if she can  the [employer] forms this Thursday or Friday?

## 2025-04-18 NOTE — TELEPHONE ENCOUNTER
Late entry  Spoke with mom and let know form completed and ready for .   Also discussed appointments and house staff lack of follow up.   She verbalized that she should be told about any concerns.

## 2025-05-01 DIAGNOSIS — R73.09 ELEVATED GLUCOSE: ICD-10-CM

## 2025-05-01 DIAGNOSIS — E78.2 MIXED HYPERLIPIDEMIA: ICD-10-CM

## 2025-05-01 DIAGNOSIS — E55.9 VITAMIN D DEFICIENCY: ICD-10-CM

## 2025-05-01 DIAGNOSIS — Z12.5 SCREENING FOR PROSTATE CANCER: ICD-10-CM

## 2025-05-01 DIAGNOSIS — R39.198 DIFFICULTY URINATING: ICD-10-CM

## 2025-05-01 DIAGNOSIS — I10 PRIMARY HYPERTENSION: ICD-10-CM

## 2025-05-01 DIAGNOSIS — R53.83 OTHER FATIGUE: ICD-10-CM

## 2025-05-06 ENCOUNTER — APPOINTMENT (OUTPATIENT)
Dept: PRIMARY CARE | Facility: CLINIC | Age: 45
End: 2025-05-06
Payer: MEDICARE

## 2025-05-07 LAB
25(OH)D3+25(OH)D2 SERPL-MCNC: 72 NG/ML (ref 30–100)
ALBUMIN SERPL-MCNC: 4 G/DL (ref 3.6–5.1)
ALP SERPL-CCNC: 69 U/L (ref 36–130)
ALT SERPL-CCNC: 18 U/L (ref 9–46)
ANION GAP SERPL CALCULATED.4IONS-SCNC: 8 MMOL/L (CALC) (ref 7–17)
AST SERPL-CCNC: 19 U/L (ref 10–40)
BILIRUB SERPL-MCNC: 0.6 MG/DL (ref 0.2–1.2)
BUN SERPL-MCNC: 18 MG/DL (ref 7–25)
CALCIUM SERPL-MCNC: 9 MG/DL (ref 8.6–10.3)
CHLORIDE SERPL-SCNC: 104 MMOL/L (ref 98–110)
CHOLEST SERPL-MCNC: 130 MG/DL
CHOLEST/HDLC SERPL: 3 (CALC)
CO2 SERPL-SCNC: 29 MMOL/L (ref 20–32)
CREAT SERPL-MCNC: 0.87 MG/DL (ref 0.6–1.29)
EGFRCR SERPLBLD CKD-EPI 2021: 108 ML/MIN/1.73M2
ERYTHROCYTE [DISTWIDTH] IN BLOOD BY AUTOMATED COUNT: 12.8 % (ref 11–15)
EST. AVERAGE GLUCOSE BLD GHB EST-MCNC: 117 MG/DL
EST. AVERAGE GLUCOSE BLD GHB EST-SCNC: 6.5 MMOL/L
GLUCOSE SERPL-MCNC: 86 MG/DL (ref 65–99)
HBA1C MFR BLD: 5.7 %
HCT VFR BLD AUTO: 47.6 % (ref 38.5–50)
HDLC SERPL-MCNC: 43 MG/DL
HGB BLD-MCNC: 15.9 G/DL (ref 13.2–17.1)
LDLC SERPL CALC-MCNC: 67 MG/DL (CALC)
MCH RBC QN AUTO: 31.2 PG (ref 27–33)
MCHC RBC AUTO-ENTMCNC: 33.4 G/DL (ref 32–36)
MCV RBC AUTO: 93.3 FL (ref 80–100)
NONHDLC SERPL-MCNC: 87 MG/DL (CALC)
PLATELET # BLD AUTO: 315 THOUSAND/UL (ref 140–400)
PMV BLD REES-ECKER: 9.6 FL (ref 7.5–12.5)
POTASSIUM SERPL-SCNC: 4.9 MMOL/L (ref 3.5–5.3)
PROT SERPL-MCNC: 6.8 G/DL (ref 6.1–8.1)
PSA SERPL-MCNC: 0.33 NG/ML
RBC # BLD AUTO: 5.1 MILLION/UL (ref 4.2–5.8)
SODIUM SERPL-SCNC: 141 MMOL/L (ref 135–146)
TRIGL SERPL-MCNC: 122 MG/DL
TSH SERPL-ACNC: 2.31 MIU/L (ref 0.4–4.5)
WBC # BLD AUTO: 4.6 THOUSAND/UL (ref 3.8–10.8)

## 2025-05-21 DIAGNOSIS — B35.3 TINEA PEDIS OF BOTH FEET: ICD-10-CM

## 2025-05-21 DIAGNOSIS — K59.00 CONSTIPATION, UNSPECIFIED CONSTIPATION TYPE: ICD-10-CM

## 2025-05-21 DIAGNOSIS — H61.20 IMPACTED CERUMEN, UNSPECIFIED LATERALITY: ICD-10-CM

## 2025-05-21 DIAGNOSIS — R23.8 SKIN IRRITATION: ICD-10-CM

## 2025-05-21 DIAGNOSIS — L30.9 DERMATITIS: ICD-10-CM

## 2025-05-21 DIAGNOSIS — R50.9 FEVER, UNSPECIFIED FEVER CAUSE: ICD-10-CM

## 2025-05-21 RX ORDER — ASCORBIC ACID 1000 MG
TABLET ORAL
Qty: 16 ML | Refills: 1 | Status: SHIPPED | OUTPATIENT
Start: 2025-05-21

## 2025-05-21 RX ORDER — CARBAMIDE PEROXIDE - 6.5% 65 MG/ML
SOLUTION/ DROPS TOPICAL
Qty: 15 ML | Refills: 1 | Status: SHIPPED | OUTPATIENT
Start: 2025-05-21

## 2025-05-21 RX ORDER — CLOTRIMAZOLE 1 %
CREAM (GRAM) TOPICAL
Qty: 35.4 G | Refills: 1 | Status: SHIPPED | OUTPATIENT
Start: 2025-05-21

## 2025-05-21 RX ORDER — BISACODYL 10 MG/1
SUPPOSITORY RECTAL
Qty: 12 SUPPOSITORY | Refills: 1 | Status: SHIPPED | OUTPATIENT
Start: 2025-05-21

## 2025-05-21 RX ORDER — FLUOCINONIDE 0.5 MG/G
CREAM TOPICAL
Qty: 60 G | Refills: 1 | Status: SHIPPED | OUTPATIENT
Start: 2025-05-21

## 2025-05-21 RX ORDER — HYDROCORTISONE 1 %
CREAM (GRAM) TOPICAL
Qty: 30 G | Refills: 1 | Status: SHIPPED | OUTPATIENT
Start: 2025-05-21

## 2025-05-21 RX ORDER — ACETAMINOPHEN 325 MG/1
TABLET ORAL
Qty: 30 TABLET | Refills: 1 | Status: SHIPPED | OUTPATIENT
Start: 2025-05-21

## 2025-05-28 ENCOUNTER — TELEPHONE (OUTPATIENT)
Dept: PRIMARY CARE | Facility: CLINIC | Age: 45
End: 2025-05-28
Payer: MEDICARE

## 2025-05-28 NOTE — TELEPHONE ENCOUNTER
Radha Bal is a Nurse with [facility] and she said his pulse ox has been low. She has him sitting up and taking deep breaths and it would come up a bit but not always. She said she understands there will be no cpap but her concern is his weight and maybe get him to do a breathing tx. She said she will be faxing over the report to you to review the pulse ox readings. If you need to contact her for anything    Radha: [phone]

## 2025-06-02 ENCOUNTER — TELEPHONE (OUTPATIENT)
Dept: PRIMARY CARE | Facility: CLINIC | Age: 45
End: 2025-06-02
Payer: MEDICARE

## 2025-06-02 NOTE — TELEPHONE ENCOUNTER
Tosin, RN with [facility], stated that she and the other nurses are concerned about Dante due to his pulse ox has been running low.  They will have him stand up and do some deep breathing, which brings it up in the 90's, but they have gotten readings as low as 62.      She would like to discuss this with you as to what they could do for intervention?  Breathing treatment, puffer, etc?  She said he will not sleep with a CPAP.    Tosin can be reached at [phone].

## 2025-06-06 DIAGNOSIS — K59.00 CONSTIPATION, UNSPECIFIED CONSTIPATION TYPE: ICD-10-CM

## 2025-06-06 DIAGNOSIS — G47.33 OBSTRUCTIVE SLEEP APNEA: Primary | ICD-10-CM

## 2025-06-06 DIAGNOSIS — R79.81 LOW OXYGEN SATURATION: ICD-10-CM

## 2025-06-06 NOTE — TELEPHONE ENCOUNTER
Tosin called today to follow up on message she left on the 2nd. She is just concerned about his breathing. She doesn't feel as though its an emergency but she's definitely concerned. She states on the 6/4 his pulse ox was 75% Pulse 86. She made him stand and take some deep breaths and O2 went up to 93%. She's concerned also that he isnt getting much exercise and has gained weight. States his new home doesn't have a sidewalk for out door walking like he used to do. And he's also been more defiant. Please advise     Next appt is 7/2

## 2025-06-07 RX ORDER — DOCUSATE SODIUM 100 MG/1
CAPSULE, LIQUID FILLED ORAL
Qty: 60 CAPSULE | Refills: 4 | Status: SHIPPED | OUTPATIENT
Start: 2025-06-07

## 2025-06-10 NOTE — PROGRESS NOTES
Call to Mom, Nani Conteh and she has noticed that he is dozing more when out with family.    Gave her information about ENT/Sleep Surgery/INSPIRE and PULM   She will schedule and let me know if there are issues.

## 2025-06-11 ENCOUNTER — TELEPHONE (OUTPATIENT)
Dept: PRIMARY CARE | Facility: CLINIC | Age: 45
End: 2025-06-11
Payer: MEDICARE

## 2025-06-11 DIAGNOSIS — R06.02 SHORTNESS OF BREATH: ICD-10-CM

## 2025-06-11 DIAGNOSIS — G47.33 OBSTRUCTIVE SLEEP APNEA: Primary | ICD-10-CM

## 2025-06-11 NOTE — TELEPHONE ENCOUNTER
Estefani pt's mom calling to let you know pt is kaz'd with ENT 7/30/2025 and with Pulmonology 9/29/2025 and is on the wait list for something sooner.

## 2025-06-17 ENCOUNTER — HOSPITAL ENCOUNTER (OUTPATIENT)
Dept: RADIOLOGY | Facility: CLINIC | Age: 45
Discharge: HOME | End: 2025-06-17
Payer: MEDICARE

## 2025-06-17 DIAGNOSIS — R06.02 SHORTNESS OF BREATH: ICD-10-CM

## 2025-06-17 DIAGNOSIS — G47.33 OBSTRUCTIVE SLEEP APNEA: ICD-10-CM

## 2025-06-17 PROCEDURE — 71046 X-RAY EXAM CHEST 2 VIEWS: CPT | Performed by: RADIOLOGY

## 2025-06-17 PROCEDURE — 71046 X-RAY EXAM CHEST 2 VIEWS: CPT

## 2025-07-02 ENCOUNTER — APPOINTMENT (OUTPATIENT)
Dept: PRIMARY CARE | Facility: CLINIC | Age: 45
End: 2025-07-02
Payer: MEDICARE

## 2025-07-03 DIAGNOSIS — Z86.711 HISTORY OF PULMONARY EMBOLISM: ICD-10-CM

## 2025-07-03 PROBLEM — J06.9 ACUTE UPPER RESPIRATORY INFECTION: Status: RESOLVED | Noted: 2025-07-03 | Resolved: 2025-07-03

## 2025-07-03 PROBLEM — F84.9 PERVASIVE DEVELOPMENTAL DISORDER (HHS-HCC): Status: ACTIVE | Noted: 2025-07-03

## 2025-07-03 PROBLEM — H10.9 CONJUNCTIVITIS: Status: RESOLVED | Noted: 2025-07-03 | Resolved: 2025-07-03

## 2025-07-03 PROBLEM — R40.0 DAYTIME SOMNOLENCE: Status: ACTIVE | Noted: 2025-07-03

## 2025-07-03 PROBLEM — Z86.39 HISTORY OF ELEVATED LIPIDS: Status: ACTIVE | Noted: 2025-07-03

## 2025-07-03 RX ORDER — APIXABAN 5 MG/1
TABLET, FILM COATED ORAL
Qty: 60 TABLET | Refills: 4 | Status: SHIPPED | OUTPATIENT
Start: 2025-07-03

## 2025-07-08 ENCOUNTER — APPOINTMENT (OUTPATIENT)
Dept: PRIMARY CARE | Facility: CLINIC | Age: 45
End: 2025-07-08
Payer: MEDICARE

## 2025-07-08 VITALS — BODY MASS INDEX: 36.96 KG/M2 | SYSTOLIC BLOOD PRESSURE: 120 MMHG | DIASTOLIC BLOOD PRESSURE: 80 MMHG | WEIGHT: 197.8 LBS

## 2025-07-08 DIAGNOSIS — E66.01 OBESITY, MORBID (MULTI): ICD-10-CM

## 2025-07-08 DIAGNOSIS — R40.0 DAYTIME SOMNOLENCE: ICD-10-CM

## 2025-07-08 DIAGNOSIS — I10 PRIMARY HYPERTENSION: ICD-10-CM

## 2025-07-08 DIAGNOSIS — R53.83 FATIGUE, UNSPECIFIED TYPE: ICD-10-CM

## 2025-07-08 DIAGNOSIS — G47.33 OBSTRUCTIVE SLEEP APNEA: Primary | ICD-10-CM

## 2025-07-08 PROCEDURE — 99214 OFFICE O/P EST MOD 30 MIN: CPT | Performed by: NURSE PRACTITIONER

## 2025-07-08 PROCEDURE — 3074F SYST BP LT 130 MM HG: CPT | Performed by: NURSE PRACTITIONER

## 2025-07-08 PROCEDURE — 3079F DIAST BP 80-89 MM HG: CPT | Performed by: NURSE PRACTITIONER

## 2025-07-08 ASSESSMENT — PATIENT HEALTH QUESTIONNAIRE - PHQ9
1. LITTLE INTEREST OR PLEASURE IN DOING THINGS: NOT AT ALL
2. FEELING DOWN, DEPRESSED OR HOPELESS: NOT AT ALL
SUM OF ALL RESPONSES TO PHQ9 QUESTIONS 1 AND 2: 0

## 2025-07-08 NOTE — PATIENT INSTRUCTIONS
Keep follow up with ENT and PULM  Use Wedge for bed  Copy of x-ray to Mom  Plan on 6 month follow up

## 2025-07-08 NOTE — PROGRESS NOTES
Subjective   Patient ID: Dante Gillespie is a 44 y.o. male who presents for Follow-up.  History of Present Illness  Dante Gillespie is a 44 year old male with a history of tracheostomy and pulmonary issues who presents for follow-up regarding his respiratory health.  He has a history of a trach (12/17/21) and feeding tube placement (2/18/21)[id number] during a COVID-19 related hospitalization, which have since been removed.     He has a history of a pulmonary embolism and small lung nodules. A CT angiogram from 2020 showed a 7 mm lung nodule and minimal post-inflammatory changes in the right lower lobe, but no pulmonary emboli were present at that time.    A recent chest x-ray showed no consolidation, effusion, edema, or pneumothorax, and the heart size was within normal limits. Mild scoliosis was noted. He has an upcoming appointment with a pulmonologist for further evaluation, including potential pulmonary function tests.    His family history includes a father with mitral valve issues and recently diagnosed atrial fibrillation. His mother has anxiety, depression, glaucoma, and high blood pressure.    He participates in [employer], playing golf and [employer], and enjoys these activities. He plays team golf with his father. No known allergies.    Rinku has significant TOMMY  Last Sleep Study 2018    He had several trials with CPAP and was unable to tolerate mask during sleep.      Saw someone at [facility] ??Colleen (8/29/2018)  To discuss [facility] but was not a candidate at that time.      Recently has been having more episodes of daytime nodding off and nursing staff found low routine pulse ox readings (<90)  Rinku lives at group home and has had some less consistent staffing.  Mother, Nani Conteh is involved with this issue again.      Review of Systems   Constitutional:  Positive for activity change and fatigue.   HENT:  Positive for trouble swallowing.    Respiratory:  Positive for apnea and shortness of breath.     Cardiovascular:  Negative for chest pain, palpitations and leg swelling.   Endocrine: Negative for cold intolerance, heat intolerance, polydipsia, polyphagia and polyuria.   Psychiatric/Behavioral:  Positive for decreased concentration and sleep disturbance.        Physical Exam  Vitals and nursing note reviewed.   Constitutional:       Appearance: He is obese.      Comments: Here with Mom, Nani Conteh today   Pleasant, cooperative.  Consistent with syndrome.   HENT:      Head: Normocephalic and atraumatic.      Right Ear: Tympanic membrane, ear canal and external ear normal.      Left Ear: Tympanic membrane, ear canal and external ear normal.      Mouth/Throat:      Comments: Tongue thrust noted    Cardiovascular:      Rate and Rhythm: Normal rate and regular rhythm.      Pulses: Normal pulses.      Heart sounds: Normal heart sounds.   Pulmonary:      Effort: Pulmonary effort is normal.      Breath sounds: Normal breath sounds.   Abdominal:      Palpations: Abdomen is soft.      Tenderness: There is no abdominal tenderness.     Skin:     General: Skin is warm.     Neurological:      Mental Status: He is alert and oriented to person, place, and time.     Psychiatric:         Mood and Affect: Mood normal.         Behavior: Behavior normal.         Results  RADIOLOGY    Chest X-ray: No consolidation, effusion, edema, or pneumothorax. Heart size within normal limits. Mild scoliosis. No evidence of acute intrathoracic abnormality.    CT Angiography: 7 mm lung nodule unchanged from prior. Minimal post-inflammatory changes in the periphery of the right lower lobe. No pulmonary emboli. (2020)       Objective     /80   Wt 89.7 kg (197 lb 12.8 oz)   BMI 36.96 kg/m²      Assessment & Plan  Pulmonary evaluation  He has tracheostomy and pulmonary concerns post-illness. Recent chest x-ray showed no consolidation, effusion, edema, or pneumothorax, with normal heart size and mild scoliosis. No acute intrathoracic  abnormalities. Pulmonologist appointment is recommended for pulmonary function tests to assess oxygen saturation changes and pulmonary function efficiency, crucial for addressing sleep-related issues. Pulmonologist may perform a six-minute walk test and pulmonary function tests.  - Continue with pulmonologist appointment  - Perform pulmonary function tests    Sleep-related issues  Sleep-related issues potentially linked to pulmonary problems and anatomical anomalies such as a large tongue and small oral cavity. Discussed potential benefit of using a wedge pillow to elevate the chest during sleep, which may alleviate symptoms. Recommended using an actual wedge over a pile of pillows for stability.  - Use a wedge pillow for sleeping    Lung nodule  A 7 mm lung nodule noted on previous CT scan, unchanged from prior imaging. As a non-smoker, concern for primary lung cancer is reduced. Nodules less than 1 cm in non-smokers typically do not require follow-up unless other risk factors are present.  - Monitor lung nodule without immediate follow-up unless risk factors change    Keratoconus  Keratoconus is a significant concern. Ophthalmologist is aware and involved in his care.    General Health Maintenance  Family history includes mitral valve issues and atrial fibrillation in the father. He has anxiety, depression, glaucoma, and hypertension. Ophthalmologist is treating glaucoma.    Follow-up  Follow-up appointment planned in six months to reassess condition and progress.  - Schedule follow-up appointment in six months    Recording duration: 22 minutes    Assessment & Plan  Obstructive sleep apnea  REFER to ENT       Fatigue, unspecified type         Daytime somnolence         Obesity, morbid (Multi)  Consider GLP-1       Primary hypertension  Stable             CURLY Dickson-CNP     This medical note was created with the assistance of artificial intelligence (AI) for documentation purposes. The content has been  reviewed and confirmed by the healthcare provider for accuracy and completeness. Patient consented to the use of audio recording and use of AI during their visit.

## 2025-07-20 PROBLEM — H61.20 EXCESSIVE CERUMEN IN EAR CANAL: Status: RESOLVED | Noted: 2023-04-04 | Resolved: 2025-07-20

## 2025-07-21 ASSESSMENT — ENCOUNTER SYMPTOMS
SLEEP DISTURBANCE: 1
ACTIVITY CHANGE: 1
POLYPHAGIA: 0
POLYDIPSIA: 0
SHORTNESS OF BREATH: 1
PALPITATIONS: 0
FATIGUE: 1
DECREASED CONCENTRATION: 1
TROUBLE SWALLOWING: 1
APNEA: 1

## 2025-07-29 NOTE — PROGRESS NOTES
"7/30/2025 new patient visit     History of Present Illness  The patient presents for evaluation of severe obstructive sleep apnea. He is accompanied by his father.    He has been diagnosed with severe obstructive sleep apnea, which causes him to fall asleep during meals, posing a choking hazard. He resides in a group home and has Down syndrome. His oxygen levels were found to be low during a recent check by the nurse, prompting a referral from his primary care physician. He has an upcoming appointment with a pulmonologist at the end of 09/2025 to further investigate his condition and explore potential treatments for his sleep apnea. He is a COVID-19 survivor, and it is suspected that this may be contributing to his current health issues.     He has previously used sleep apnea machines but struggles with them due to discomfort around his face, neck, and head, often removing them during the night or not wearing them at all. Both CPAP and BPAP have been tried without success. His last sleep study was conducted in 2018, prior to the COVID-19 pandemic. A home sleep test was attempted but was also unsuccessful. His father believes that a home sleep test might be more suitable now that he is living in a different group home where staff are more familiar with his needs. His communication skills are limited, making him difficult to understand, but he is able to comprehend discussions about procedures.        Referred by Sarah JONES per her last encounter   \"Pulmonary evaluation  He has tracheostomy and pulmonary concerns post-illness. Recent chest x-ray showed no consolidation, effusion, edema, or pneumothorax, with normal heart size and mild scoliosis. No acute intrathoracic abnormalities. Pulmonologist appointment is recommended for pulmonary function tests to assess oxygen saturation changes and pulmonary function efficiency, crucial for addressing sleep-related issues. Pulmonologist may perform a six-minute " "walk test and pulmonary function tests.  - Continue with pulmonologist appointment  - Perform pulmonary function tests     Sleep-related issues  Sleep-related issues potentially linked to pulmonary problems and anatomical anomalies such as a large tongue and small oral cavity. Discussed potential benefit of using a wedge pillow to elevate the chest during sleep, which may alleviate symptoms. Recommended using an actual wedge over a pile of pillows for stability.  - Use a wedge pillow for sleeping     Lung nodule  A 7 mm lung nodule noted on previous CT scan, unchanged from prior imaging. As a non-smoker, concern for primary lung cancer is reduced. Nodules less than 1 cm in non-smokers typically do not require follow-up unless other risk factors are present.  - Monitor lung nodule without immediate follow-up unless risk factors change     Keratoconus  Keratoconus is a significant concern. Ophthalmologist is aware and involved in his care.     General Health Maintenance  Family history includes mitral valve issues and atrial fibrillation in the father. He has anxiety, depression, glaucoma, and hypertension. Ophthalmologist is treating glaucoma.\"        Sleep Survey  [place] sleepiness scale 16/24  Fatigue severity scale 55/64  Nasal obstruction and Septoplasty effectiveness scale 4/20  Nasal obstruction VAS 1/10  Snoring VAS 10/10      Sleep study histories: (personally reviewed raw data such as interpretation report, data sheet, hypnogram, and titration table)  None currently in chart   Weight management    Oral appliance history    PAP Provider           [employer] qualifications for Medicare and Medicaid    22 years and older    BMI <35    AHI 15-65    PSG/HST scored at 4% within 24 months of initial consult date    Will not do Pre D or prior auth so stick closely to the above FDA requirements.  Most managed Medicare and Medicaid will do a prior auth so submit all patients through [employer] Auth Dept    Physical " Exam  Internal nasal valve: no significant collapse.  Inferior turbinates: Turbinates are enlarged.  Septum: midline no significant septum deviation.  Middle meatuses: Patent and clear.  Superior meatuses and sphenoethmoid recesses: patent and clear.  Nasopharynx: clear without lesions or masses.  Coupling: Positive.  Lingual tonsils grade: Tonsils are enlarged   Vocal Fold Visualization: Tongue is very enlarged.  Other observations: Mallampati score of 2 or 3, Broad neck, Very fatigued, falling asleep during the examination.    Assessment & Plan  1. Severe obstructive sleep apnea.  Severe fatigue suggests a high likelihood of severe sleep apnea. Enlarged tonsils and a significantly enlarged tongue base may necessitate two separate procedures. It is common for patients with Down syndrome to have severe sleep apnea due to their constitution. A drug-induced sleep endoscopy will be ordered to assess anatomy and determine if Inspire therapy is suitable. Additionally, a new sleep study will be conducted to evaluate the severity of the condition. Sleeping at an angle is advised to aid in better saturation and reduce events. If the in-lab sleep study is not tolerated, a home sleep test will be considered.    2. Enlarged tonsils.  Enlarged tonsils are contributing to the obstructive sleep apnea. The plan includes evaluating the need for a tonsillectomy as part of the treatment strategy. This will be assessed further during the drug-induced sleep endoscopy and sleep study.

## 2025-07-30 ENCOUNTER — APPOINTMENT (OUTPATIENT)
Dept: OTOLARYNGOLOGY | Facility: CLINIC | Age: 45
End: 2025-07-30
Payer: MEDICARE

## 2025-07-30 VITALS — HEIGHT: 61 IN | WEIGHT: 195 LBS | BODY MASS INDEX: 36.82 KG/M2

## 2025-07-30 DIAGNOSIS — G47.33 OSA (OBSTRUCTIVE SLEEP APNEA): ICD-10-CM

## 2025-07-30 PROCEDURE — 99205 OFFICE O/P NEW HI 60 MIN: CPT

## 2025-07-30 PROCEDURE — 3008F BODY MASS INDEX DOCD: CPT

## 2025-07-30 ASSESSMENT — PATIENT HEALTH QUESTIONNAIRE - PHQ9
SUM OF ALL RESPONSES TO PHQ9 QUESTIONS 1 AND 2: 0
1. LITTLE INTEREST OR PLEASURE IN DOING THINGS: NOT AT ALL
2. FEELING DOWN, DEPRESSED OR HOPELESS: NOT AT ALL

## 2025-07-30 NOTE — PATIENT INSTRUCTIONS
In Lab Sleep Study      In lab sleep test has been ordered at this location:    [facility]: [phone]    [place] [facility] [phone]    [facility] [phone]    [facility] [phone]    [facility] [phone]    [place] [facility] [phone]    [facility] [phone]    [place] [facility] [phone]    [facility] [phone]      Once your insurance has approved your sleep study the sleep center will call you to arrange a date for your sleep study. If you do not hear from the sleep center in 1-2 weeks call that location to schedule an appointment.       Follow up with DR Thorne 2 weeks after sleep study  Call for appt: [phone]

## 2025-08-05 DIAGNOSIS — E55.9 VITAMIN D DEFICIENCY: ICD-10-CM

## 2025-08-05 DIAGNOSIS — N40.0 BENIGN PROSTATIC HYPERPLASIA, UNSPECIFIED WHETHER LOWER URINARY TRACT SYMPTOMS PRESENT: ICD-10-CM

## 2025-08-05 DIAGNOSIS — K21.9 GASTROESOPHAGEAL REFLUX DISEASE, UNSPECIFIED WHETHER ESOPHAGITIS PRESENT: ICD-10-CM

## 2025-08-05 RX ORDER — FAMOTIDINE 20 MG/1
20 TABLET, FILM COATED ORAL
Qty: 60 TABLET | Refills: 3 | Status: SHIPPED | OUTPATIENT
Start: 2025-08-05

## 2025-08-05 RX ORDER — VIT C/E/ZN/COPPR/LUTEIN/ZEAXAN 250MG-90MG
125 CAPSULE ORAL DAILY
Qty: 30 CAPSULE | Refills: 3 | Status: SHIPPED | OUTPATIENT
Start: 2025-08-05

## 2025-08-05 RX ORDER — TAMSULOSIN HYDROCHLORIDE 0.4 MG/1
CAPSULE ORAL
Qty: 30 CAPSULE | Refills: 3 | Status: SHIPPED | OUTPATIENT
Start: 2025-08-05

## 2026-01-08 ENCOUNTER — APPOINTMENT (OUTPATIENT)
Dept: PRIMARY CARE | Facility: CLINIC | Age: 46
End: 2026-01-08
Payer: MEDICARE

## 2026-02-10 ENCOUNTER — APPOINTMENT (OUTPATIENT)
Dept: PRIMARY CARE | Facility: CLINIC | Age: 46
End: 2026-02-10
Payer: MEDICARE